# Patient Record
Sex: FEMALE | Race: BLACK OR AFRICAN AMERICAN | NOT HISPANIC OR LATINO | URBAN - METROPOLITAN AREA
[De-identification: names, ages, dates, MRNs, and addresses within clinical notes are randomized per-mention and may not be internally consistent; named-entity substitution may affect disease eponyms.]

---
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
## 2018-04-27 ENCOUNTER — EMERGENCY (EMERGENCY)
Facility: HOSPITAL | Age: 18
LOS: 1 days | Discharge: HOME | End: 2018-04-27
Attending: EMERGENCY MEDICINE | Admitting: EMERGENCY MEDICINE
Payer: COMMERCIAL

## 2018-04-27 VITALS
SYSTOLIC BLOOD PRESSURE: 135 MMHG | HEART RATE: 111 BPM | RESPIRATION RATE: 18 BRPM | DIASTOLIC BLOOD PRESSURE: 83 MMHG | OXYGEN SATURATION: 98 % | TEMPERATURE: 98 F

## 2018-04-27 VITALS — HEIGHT: 67 IN | WEIGHT: 188.94 LBS

## 2018-04-27 PROCEDURE — 99283 EMERGENCY DEPT VISIT LOW MDM: CPT

## 2018-04-27 PROCEDURE — 99284 EMERGENCY DEPT VISIT MOD MDM: CPT

## 2018-04-27 PROCEDURE — 73610 X-RAY EXAM OF ANKLE: CPT | Mod: 26,LT

## 2018-04-27 PROCEDURE — 73610 X-RAY EXAM OF ANKLE: CPT

## 2018-04-27 PROCEDURE — 73620 X-RAY EXAM OF FOOT: CPT

## 2018-04-27 PROCEDURE — 73620 X-RAY EXAM OF FOOT: CPT | Mod: 26,LT

## 2018-04-27 NOTE — ED ADULT NURSE NOTE - CCCP TRG CHIEF CMPLNT
Medical Necessity Clause: This procedure was medically necessary because the lesion that was treated was: pain, foot

## 2018-04-27 NOTE — ED ADULT NURSE NOTE - OBJECTIVE STATEMENT
19 y/o female presents to the ed today c/o right foot pain while at work today. denies any injury or trauma to the area. no deformity o swelling

## 2019-12-24 ENCOUNTER — TRANSCRIPTION ENCOUNTER (OUTPATIENT)
Age: 19
End: 2019-12-24

## 2020-02-24 NOTE — ED ADULT NURSE NOTE - CHIEF COMPLAINT QUOTE
pt biba, states "I had a seizure in my sleep" used to take keppra in past but was taken off of it a year ago, a/o x 3 n/a

## 2022-04-14 NOTE — ED PROVIDER NOTE - RESPIRATORY, MLM
From: Wally Belcher  To: Tiffany Kirby  Sent: 4/14/2022 1:01 AM CDT  Subject: Question regarding THYROID STIMULATING HORMONE    Can you explain this test results please    Breath sounds clear and equal bilaterally.

## 2023-11-03 PROBLEM — Z00.00 ENCOUNTER FOR PREVENTIVE HEALTH EXAMINATION: Status: ACTIVE | Noted: 2023-11-03

## 2024-06-28 PROBLEM — R56.9 UNSPECIFIED CONVULSIONS: Chronic | Status: ACTIVE | Noted: 2017-06-18

## 2024-07-30 ENCOUNTER — APPOINTMENT (OUTPATIENT)
Dept: OTOLARYNGOLOGY | Facility: CLINIC | Age: 24
End: 2024-07-30
Payer: MEDICAID

## 2024-07-30 VITALS — DIASTOLIC BLOOD PRESSURE: 83 MMHG | HEART RATE: 99 BPM | SYSTOLIC BLOOD PRESSURE: 123 MMHG

## 2024-07-30 DIAGNOSIS — Z83.3 FAMILY HISTORY OF DIABETES MELLITUS: ICD-10-CM

## 2024-07-30 DIAGNOSIS — J30.9 ALLERGIC RHINITIS, UNSPECIFIED: ICD-10-CM

## 2024-07-30 DIAGNOSIS — Z78.9 OTHER SPECIFIED HEALTH STATUS: ICD-10-CM

## 2024-07-30 DIAGNOSIS — G40.909 EPILEPSY, UNSPECIFIED, NOT INTRACTABLE, W/OUT STATUS EPILEPTICUS: ICD-10-CM

## 2024-07-30 DIAGNOSIS — Z86.59 PERSONAL HISTORY OF OTHER MENTAL AND BEHAVIORAL DISORDERS: ICD-10-CM

## 2024-07-30 DIAGNOSIS — Z86.39 PERSONAL HISTORY OF OTHER ENDOCRINE, NUTRITIONAL AND METABOLIC DISEASE: ICD-10-CM

## 2024-07-30 DIAGNOSIS — Z82.49 FAMILY HISTORY OF ISCHEMIC HEART DISEASE AND OTHER DISEASES OF THE CIRCULATORY SYSTEM: ICD-10-CM

## 2024-07-30 DIAGNOSIS — Z83.438 FAMILY HISTORY OF OTHER DISORDER OF LIPOPROTEIN METABOLISM AND OTHER LIPIDEMIA: ICD-10-CM

## 2024-07-30 DIAGNOSIS — J34.2 DEVIATED NASAL SEPTUM: ICD-10-CM

## 2024-07-30 PROCEDURE — 99204 OFFICE O/P NEW MOD 45 MIN: CPT | Mod: 25

## 2024-07-30 PROCEDURE — 31231 NASAL ENDOSCOPY DX: CPT

## 2024-07-30 RX ORDER — DIVALPROEX SODIUM 500 MG/1
TABLET, DELAYED RELEASE ORAL
Refills: 0 | Status: ACTIVE | COMMUNITY

## 2024-07-30 RX ORDER — NORETHINDRONE AND ETHINYL ESTRADIOL 0.4-0.035
KIT ORAL
Refills: 0 | Status: ACTIVE | COMMUNITY

## 2024-07-30 RX ORDER — ESCITALOPRAM OXALATE 5 MG/1
TABLET, FILM COATED ORAL
Refills: 0 | Status: ACTIVE | COMMUNITY

## 2024-07-31 PROBLEM — J30.9 ALLERGIC RHINITIS, MILD: Status: ACTIVE | Noted: 2024-07-31

## 2024-07-31 NOTE — HISTORY OF PRESENT ILLNESS
[de-identified] : 24 year old female here for deviated septum, referred by Dr. Lyons for surgery. Pt reports nasal congestion with difficulty breathing through R side for several years, worsening in the past 4 months. She also reports facial pressure and headaches. No history of trauma to the nose. Denies CT scan/imaging. She has good sense of smell. Has about 4-5 sinus infections a year, does not appear to clear up with antibiotics anymore. She has tried multiple nasal sprays without relief.

## 2024-07-31 NOTE — CONSULT LETTER
[Dear  ___] : Dear  [unfilled], [Courtesy Letter:] : I had the pleasure of seeing your patient, [unfilled], in my office today. [Please see my note below.] : Please see my note below. [Sincerely,] : Sincerely, [FreeTextEntry2] : Dr. Myrna Sullivan [FreeTextEntry3] : Benjamin Maza MD, [place]  Otolaryngology  Sinus and Endoscopic Skull Base Surgery  Head and Neck Surgery   [address], [address]  [place] [address]  Tel: [phone]  Fax:[phone]   ARMANDO Elizondo, PA-C Department of Otolaryngology [facility] Otolaryngology at [place]

## 2024-07-31 NOTE — PROCEDURE
[FreeTextEntry6] : Nasal Endoscopy: Procedure: Bilateral nasal endoscopy (CPT 65675)   Indication: Anterior rhinoscopy was inadequate to evaluate pathology.  Left: Septum deviated R Moderate inferior turbinate hypertrophy  Middle meatus clear, without masses, polyps, or pus Sphenoethmoidal recess clear, without masses, polyps, or pus  Right:  Septum deviated R,  Moderate inferior turbinate hypertrophy Middle meatus clear, without masses, polyps, or pus  Sphenoethmoidal recess clear, without masses, polyps, or pus

## 2024-07-31 NOTE — PLAN
[TextEntry] : Given that she has failed multiple medical therapies, we reviewed treatment options including surgery. Surgery would be int he form of septoplasty and turbinate reduction. The procedure itself was discussed at length. The risks, benefits, and alternatives were explained in detail which include but are not limited to: anesthesia, pain, loss of smell/taste, bleeding, infection, need for nasal packing, double vision, vision loss, CSF leak requiring other procedures to repair, numbness of teeth and/or face, need for revision surgery, brain/skull base injury, a poor result, as well as unexpected/unanticipated risks.   In [facility], given extent of edema and failed medical therapy and frequency of infections, we will obtain a CT sinus. I will call with the results and we will discuss surgical plan including just septolpasty vs septum/FESS. I will call with results.   She will obtain clearance from PCP and neurologist.

## 2024-07-31 NOTE — PLAN
[TextEntry] : Given that she has failed multiple medical therapies, we reviewed treatment options including surgery. Surgery would be int he form of septoplasty and turbinate reduction. The procedure itself was discussed at length. The risks, benefits, and alternatives were explained in detail which include but are not limited to: anesthesia, pain, loss of smell/taste, bleeding, infection, need for nasal packing, double vision, vision loss, CSF leak requiring other procedures to repair, numbness of teeth and/or face, need for revision surgery, brain/skull base injury, a poor result, as well as unexpected/unanticipated risks.   In [facility], given extent of edema and failed medical therapy and frequency of infections, we will obtain a CT sinus. I will call with the results and we will discuss surgical plan including just septolpasty vs septum/FESS. I will call with results.   She will obtain clearance from PCP and neurologist.

## 2024-07-31 NOTE — PROCEDURE
[FreeTextEntry6] : Nasal Endoscopy: Procedure: Bilateral nasal endoscopy (CPT 33367)   Indication: Anterior rhinoscopy was inadequate to evaluate pathology.  Left: Septum deviated R Moderate inferior turbinate hypertrophy  Middle meatus clear, without masses, polyps, or pus Sphenoethmoidal recess clear, without masses, polyps, or pus  Right:  Septum deviated R,  Moderate inferior turbinate hypertrophy Middle meatus clear, without masses, polyps, or pus  Sphenoethmoidal recess clear, without masses, polyps, or pus

## 2024-07-31 NOTE — CONSULT LETTER
[Dear  ___] : Dear  [unfilled], [Courtesy Letter:] : I had the pleasure of seeing your patient, [unfilled], in my office today. [Please see my note below.] : Please see my note below. [Sincerely,] : Sincerely, [FreeTextEntry2] : Dr. Myrna Sullivan [FreeTextEntry3] : Benjamin Maza MD, [place]  Otolaryngology  Sinus and Endoscopic Skull Base Surgery  Head and Neck Surgery   [address], [address]  [place] [address]  Tel: [phone]  Fax:[phone]   ARMANDO Elizondo, PA-C Department of Otolaryngology [facility] Otolaryngology at [place]

## 2024-07-31 NOTE — HISTORY OF PRESENT ILLNESS
[de-identified] : 24 year old female here for deviated septum, referred by Dr. Lyons for surgery. Pt reports nasal congestion with difficulty breathing through R side for several years, worsening in the past 4 months. She also reports facial pressure and headaches. No history of trauma to the nose. Denies CT scan/imaging. She has good sense of smell. Has about 4-5 sinus infections a year, does not appear to clear up with antibiotics anymore. She has tried multiple nasal sprays without relief.

## 2024-08-08 ENCOUNTER — APPOINTMENT (OUTPATIENT)
Dept: CT IMAGING | Facility: CLINIC | Age: 24
End: 2024-08-08

## 2024-08-08 PROCEDURE — 70486 CT MAXILLOFACIAL W/O DYE: CPT

## 2024-08-22 ENCOUNTER — OUTPATIENT (OUTPATIENT)
Dept: OUTPATIENT SERVICES | Facility: HOSPITAL | Age: 24
LOS: 1 days | End: 2024-08-22
Payer: COMMERCIAL

## 2024-08-22 DIAGNOSIS — Z01.818 ENCOUNTER FOR OTHER PREPROCEDURAL EXAMINATION: ICD-10-CM

## 2024-08-22 LAB
A1C WITH ESTIMATED AVERAGE GLUCOSE RESULT: 4.8 % — SIGNIFICANT CHANGE UP (ref 4–5.6)
ALBUMIN SERPL ELPH-MCNC: 4 G/DL — SIGNIFICANT CHANGE UP (ref 3.3–5.2)
ALP SERPL-CCNC: 39 U/L — LOW (ref 40–120)
ALT FLD-CCNC: 24 U/L — SIGNIFICANT CHANGE UP
ANION GAP SERPL CALC-SCNC: 12 MMOL/L — SIGNIFICANT CHANGE UP (ref 5–17)
AST SERPL-CCNC: 32 U/L — HIGH
BASOPHILS # BLD AUTO: 0.05 K/UL — SIGNIFICANT CHANGE UP (ref 0–0.2)
BASOPHILS NFR BLD AUTO: 0.5 % — SIGNIFICANT CHANGE UP (ref 0–2)
BILIRUB SERPL-MCNC: 0.6 MG/DL — SIGNIFICANT CHANGE UP (ref 0.4–2)
BUN SERPL-MCNC: 10.5 MG/DL — SIGNIFICANT CHANGE UP (ref 8–20)
CALCIUM SERPL-MCNC: 9.1 MG/DL — SIGNIFICANT CHANGE UP (ref 8.4–10.5)
CHLORIDE SERPL-SCNC: 101 MMOL/L — SIGNIFICANT CHANGE UP (ref 96–108)
CO2 SERPL-SCNC: 25 MMOL/L — SIGNIFICANT CHANGE UP (ref 22–29)
CREAT SERPL-MCNC: 0.74 MG/DL — SIGNIFICANT CHANGE UP (ref 0.5–1.3)
EGFR: 114 ML/MIN/1.73M2 — SIGNIFICANT CHANGE UP
EOSINOPHIL # BLD AUTO: 0.08 K/UL — SIGNIFICANT CHANGE UP (ref 0–0.5)
EOSINOPHIL NFR BLD AUTO: 0.9 % — SIGNIFICANT CHANGE UP (ref 0–6)
ESTIMATED AVERAGE GLUCOSE: 91 MG/DL — SIGNIFICANT CHANGE UP (ref 68–114)
GLUCOSE SERPL-MCNC: 89 MG/DL — SIGNIFICANT CHANGE UP (ref 70–99)
HCG UR QL: NEGATIVE — SIGNIFICANT CHANGE UP
HCT VFR BLD CALC: 38.5 % — SIGNIFICANT CHANGE UP (ref 34.5–45)
HGB BLD-MCNC: 12.2 G/DL — SIGNIFICANT CHANGE UP (ref 11.5–15.5)
IMM GRANULOCYTES NFR BLD AUTO: 0.1 % — SIGNIFICANT CHANGE UP (ref 0–0.9)
LYMPHOCYTES # BLD AUTO: 3.38 K/UL — HIGH (ref 1–3.3)
LYMPHOCYTES # BLD AUTO: 36.9 % — SIGNIFICANT CHANGE UP (ref 13–44)
MCHC RBC-ENTMCNC: 27.7 PG — SIGNIFICANT CHANGE UP (ref 27–34)
MCHC RBC-ENTMCNC: 31.7 GM/DL — LOW (ref 32–36)
MCV RBC AUTO: 87.3 FL — SIGNIFICANT CHANGE UP (ref 80–100)
MONOCYTES # BLD AUTO: 0.6 K/UL — SIGNIFICANT CHANGE UP (ref 0–0.9)
MONOCYTES NFR BLD AUTO: 6.6 % — SIGNIFICANT CHANGE UP (ref 2–14)
NEUTROPHILS # BLD AUTO: 5.03 K/UL — SIGNIFICANT CHANGE UP (ref 1.8–7.4)
NEUTROPHILS NFR BLD AUTO: 55 % — SIGNIFICANT CHANGE UP (ref 43–77)
PLATELET # BLD AUTO: 277 K/UL — SIGNIFICANT CHANGE UP (ref 150–400)
POTASSIUM SERPL-MCNC: 4.6 MMOL/L — SIGNIFICANT CHANGE UP (ref 3.5–5.3)
POTASSIUM SERPL-SCNC: 4.6 MMOL/L — SIGNIFICANT CHANGE UP (ref 3.5–5.3)
PROT SERPL-MCNC: 7.5 G/DL — SIGNIFICANT CHANGE UP (ref 6.6–8.7)
RBC # BLD: 4.41 M/UL — SIGNIFICANT CHANGE UP (ref 3.8–5.2)
RBC # FLD: 13.1 % — SIGNIFICANT CHANGE UP (ref 10.3–14.5)
SODIUM SERPL-SCNC: 138 MMOL/L — SIGNIFICANT CHANGE UP (ref 135–145)
WBC # BLD: 9.15 K/UL — SIGNIFICANT CHANGE UP (ref 3.8–10.5)
WBC # FLD AUTO: 9.15 K/UL — SIGNIFICANT CHANGE UP (ref 3.8–10.5)

## 2024-08-22 PROCEDURE — 80053 COMPREHEN METABOLIC PANEL: CPT

## 2024-08-22 PROCEDURE — 85025 COMPLETE CBC W/AUTO DIFF WBC: CPT

## 2024-08-22 PROCEDURE — 36415 COLL VENOUS BLD VENIPUNCTURE: CPT

## 2024-08-22 PROCEDURE — G0463: CPT

## 2024-08-22 PROCEDURE — 83036 HEMOGLOBIN GLYCOSYLATED A1C: CPT

## 2024-08-22 PROCEDURE — 81025 URINE PREGNANCY TEST: CPT

## 2024-09-04 ENCOUNTER — RESULT REVIEW (OUTPATIENT)
Age: 24
End: 2024-09-04

## 2024-09-04 ENCOUNTER — APPOINTMENT (OUTPATIENT)
Dept: OTOLARYNGOLOGY | Facility: AMBULATORY SURGERY CENTER | Age: 24
End: 2024-09-04
Payer: MEDICAID

## 2024-09-04 PROCEDURE — 30520 REPAIR OF NASAL SEPTUM: CPT

## 2024-09-04 PROCEDURE — 30140 RESECT INFERIOR TURBINATE: CPT | Mod: 50

## 2024-09-10 ENCOUNTER — APPOINTMENT (OUTPATIENT)
Dept: OTOLARYNGOLOGY | Facility: CLINIC | Age: 24
End: 2024-09-10
Payer: MEDICAID

## 2024-09-10 VITALS
DIASTOLIC BLOOD PRESSURE: 79 MMHG | SYSTOLIC BLOOD PRESSURE: 120 MMHG | WEIGHT: 213 LBS | HEIGHT: 67 IN | HEART RATE: 91 BPM | BODY MASS INDEX: 33.43 KG/M2

## 2024-09-10 DIAGNOSIS — J34.2 DEVIATED NASAL SEPTUM: ICD-10-CM

## 2024-09-10 DIAGNOSIS — J30.9 ALLERGIC RHINITIS, UNSPECIFIED: ICD-10-CM

## 2024-09-10 PROCEDURE — 99213 OFFICE O/P EST LOW 20 MIN: CPT | Mod: 25

## 2024-09-10 NOTE — PROCEDURE
[FreeTextEntry6] : Nasal Endoscopy: Bilateral nasal cavity debridement (CPT 53696)   Indication:   Procedure: There was expected post operative inflammation in both the right and left nasal cavity. Thick mucoid secretions and blood clot were suctioned. Doyles' removed  Left: The septum is midline The inferior turbinate was well reduced/outfractured. Thick mucus and clots suctioned MM and SER are clear   Right: The septum is midline The inferior turbinate was well reduced/outfractured. Thick mucus and clots suctioned The MM and SER are clear.

## 2024-09-10 NOTE — HISTORY OF PRESENT ILLNESS
[de-identified] : Update 9/10/24: f/u septoplasty and turbinate reduction 9/4/24. Having some jaw pain and nose tenderness. Using saline irrigations.  Pathology: benign cartilage and bone  24 year old female here for deviated septum, referred by Dr. Lyons for surgery. Pt reports nasal congestion with difficulty breathing through R side for several years, worsening in the past 4 months. She also reports facial pressure and headaches. No history of trauma to the nose. Denies CT scan/imaging. She has good sense of smell. Has about 4-5 sinus infections a year, does not appear to clear up with antibiotics anymore. She has tried multiple nasal sprays without relief. Negative Screen

## 2024-10-18 ENCOUNTER — APPOINTMENT (OUTPATIENT)
Dept: OTOLARYNGOLOGY | Facility: CLINIC | Age: 24
End: 2024-10-18
Payer: MEDICAID

## 2024-10-18 VITALS
SYSTOLIC BLOOD PRESSURE: 132 MMHG | BODY MASS INDEX: 33.43 KG/M2 | WEIGHT: 213 LBS | DIASTOLIC BLOOD PRESSURE: 84 MMHG | HEIGHT: 67 IN | HEART RATE: 96 BPM

## 2024-10-18 DIAGNOSIS — J34.2 DEVIATED NASAL SEPTUM: ICD-10-CM

## 2024-10-18 DIAGNOSIS — J30.9 ALLERGIC RHINITIS, UNSPECIFIED: ICD-10-CM

## 2024-10-18 PROCEDURE — 99213 OFFICE O/P EST LOW 20 MIN: CPT | Mod: 25

## 2025-02-03 ENCOUNTER — NON-APPOINTMENT (OUTPATIENT)
Age: 25
End: 2025-02-03